# Patient Record
Sex: MALE | Race: WHITE
[De-identification: names, ages, dates, MRNs, and addresses within clinical notes are randomized per-mention and may not be internally consistent; named-entity substitution may affect disease eponyms.]

---
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
## 2022-04-27 ENCOUNTER — HOSPITAL ENCOUNTER (EMERGENCY)
Dept: HOSPITAL 74 - JERFT | Age: 22
Discharge: HOME | End: 2022-04-27
Payer: COMMERCIAL

## 2022-04-27 VITALS — BODY MASS INDEX: 38 KG/M2

## 2022-04-27 VITALS — SYSTOLIC BLOOD PRESSURE: 127 MMHG | DIASTOLIC BLOOD PRESSURE: 81 MMHG | HEART RATE: 89 BPM | TEMPERATURE: 97.8 F

## 2022-04-27 DIAGNOSIS — L02.31: Primary | ICD-10-CM

## 2023-10-23 ENCOUNTER — HOSPITAL ENCOUNTER (EMERGENCY)
Dept: HOSPITAL 74 - JERFT | Age: 23
Discharge: HOME | End: 2023-10-23
Payer: COMMERCIAL

## 2023-10-23 VITALS
RESPIRATION RATE: 16 BRPM | HEART RATE: 74 BPM | SYSTOLIC BLOOD PRESSURE: 117 MMHG | DIASTOLIC BLOOD PRESSURE: 73 MMHG | TEMPERATURE: 98.6 F

## 2023-10-23 VITALS — BODY MASS INDEX: 23.1 KG/M2
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
## 2023-10-23 DIAGNOSIS — R22.41: ICD-10-CM

## 2023-10-23 DIAGNOSIS — M25.571: ICD-10-CM

## 2023-10-23 DIAGNOSIS — S96.911A: Primary | ICD-10-CM
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
